# Patient Record
Sex: FEMALE | Race: WHITE | NOT HISPANIC OR LATINO | Employment: STUDENT | URBAN - METROPOLITAN AREA
[De-identification: names, ages, dates, MRNs, and addresses within clinical notes are randomized per-mention and may not be internally consistent; named-entity substitution may affect disease eponyms.]

---
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
## 2021-04-08 ENCOUNTER — OFFICE VISIT - RIVER FALLS (OUTPATIENT)
Dept: FAMILY MEDICINE | Facility: CLINIC | Age: 21
End: 2021-04-08

## 2021-04-08 ASSESSMENT — MIFFLIN-ST. JEOR: SCORE: 1390.49

## 2021-07-14 ENCOUNTER — OFFICE VISIT - RIVER FALLS (OUTPATIENT)
Dept: FAMILY MEDICINE | Facility: CLINIC | Age: 21
End: 2021-07-14

## 2021-09-17 ENCOUNTER — OFFICE VISIT - RIVER FALLS (OUTPATIENT)
Dept: FAMILY MEDICINE | Facility: CLINIC | Age: 21
End: 2021-09-17

## 2022-02-11 VITALS
BODY MASS INDEX: 21.81 KG/M2 | SYSTOLIC BLOOD PRESSURE: 92 MMHG | DIASTOLIC BLOOD PRESSURE: 56 MMHG | OXYGEN SATURATION: 99 % | TEMPERATURE: 98.2 F | HEART RATE: 76 BPM | WEIGHT: 130.9 LBS | HEIGHT: 65 IN

## 2022-02-11 VITALS — SYSTOLIC BLOOD PRESSURE: 100 MMHG | DIASTOLIC BLOOD PRESSURE: 66 MMHG | TEMPERATURE: 98.6 F | WEIGHT: 140 LBS

## 2022-02-11 VITALS
SYSTOLIC BLOOD PRESSURE: 102 MMHG | OXYGEN SATURATION: 100 % | HEIGHT: 65 IN | WEIGHT: 136.6 LBS | HEART RATE: 62 BPM | BODY MASS INDEX: 22.76 KG/M2 | TEMPERATURE: 98.2 F | DIASTOLIC BLOOD PRESSURE: 67 MMHG

## 2022-02-15 NOTE — LETTER
(Inserted Image. Unable to display)   November 27, 2020      TATA ROB      [address], [place] [id number]        Dear TATA,    Thank you for selecting [facility] for your healthcare needs.  Below you will find the results of the recent tests done at our clinic.      These tests are negative, Tata. Thank you.      Result Name Current Result Reference Range   Chlam/N. gonorrhea Comments  See comment 11/24/2020    Chlamydia RNA  NOT DETECTED 11/24/2020 NOT DETECTED -    Neisseria gonorrhoeae RNA  NOT DETECTED 11/24/2020 NOT DETECTED -        Please contact me or my assistant at [phone] if you have any questions or concerns.     Sincerely,        BLAKE Farooq-NP  Family Nurse Practitioner      What do your labs mean?  Below is a glossary of commonly ordered labs:  LDL   Bad Cholesterol   HDL   Good Cholesterol  AST/ALT   Liver Function   Cr/Creatinine   Kidney Function  Microalbumin   Kidney Function  BUN   Kidney Function  PSA   Prostate    TSH   Thyroid Hormone  HgbA1c   Diabetes Test   Hgb (Hemoglobin)   Red Blood Cells  WBC   White Blood Cell Count

## 2022-02-15 NOTE — PROGRESS NOTES
Patient:   REJI ROB            MRN: [id number]            FIN: [id number]               Age:   21 years     Sex:  Female     :  2000   Associated Diagnoses:   Bacterial sinusitis   Author:   Ryan Armstrong MD      Visit Information      Date of Service: 2021 07:47 am  Performing Location: [facility]  Encounter#: [id number]      Primary Care Provider (PCP):  NONE ,       Referring Provider:  Ryan Armstrong MD    NPI# [id number]   Visit type:  Video Visit via [employer] or [employer].    Participants in room during visit:  _pt   Location of patient:  _RF  Location of provider:  _ (Clinic office   Video Start Time:  _856  Video End Time:   _901    Today's visit was conducted via video conference due to the COVID-19 pandemic.  The patient's consent to proceed with a video visit has been obtained and documented.      Chief Complaint   2021 8:54 AM CDT    c/o teeth pain, head pain, nasal congestion x 2 days; verbal consent given for video visit        History of Present Illness   Patient  is being evaluated via a billable video visit.  Patient calls with a possible sinus infection. 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 She is starting on  she had a low-grade fever with congestion. Over the course last 24 hours now she has developed bilateral maxillary pain tooth pain of her upper teeth on either side a lot of nasal congestion with yellow-green bloody nasal drainage.         Review of Systems   Constitutional:  Negative except as documented in history of present illness.    Eye:  Negative.    Ear/Nose/Mouth/Throat:  Negative except as documented in history of present illness.    Respiratory:  Negative.    Cardiovascular:  Negative.    Gastrointestinal:  Negative.    Genitourinary:  Negative.    Immunologic:  Negative.    Musculoskeletal:  Negative.    Integumentary:  Negative.    Neurologic:  Negative.    Psychiatric:  Negative.       Health Status    Allergies:    Allergic Reactions (Selected)  Severity Not Documented  Sulfa drugs (Rash)   Medications:  (Selected)   Prescriptions  Prescribed  Azithromycin 5 Day Dose Pack 250 mg oral tablet: 2 tabs today then 1 a day for 4 days, PO, qAM, x 5 day(s), Instructions: as directed on package labeling, # 6 tab(s), 0 Refill(s), Type: Acute, Pharmacy: [facility] #[id number], 2 tabs today then 1 a day for 4 days Oral qam,x5 day(s),Instr:as dir...  Flonase 50 mcg/inh nasal spray: = 2 spray(s), Nasal, daily, # 1 EA, 6 Refill(s), Type: Maintenance, Pharmacy: [facility] #[id number], 2 spray(s) Nasal daily, 65, in, 04/08/21 11:24:00 CDT, Height Measured, 136.6, lb, 04/08/21 11:24:00 CDT, Weight Measured  Documented Medications  Documented  Juleber 0.15 mg-0.03 mg oral tablet: 0 Refill(s), Type: Soft Stop   Problem list:    No problem items selected or recorded.      Histories   Past Medical History:    No active or resolved past medical history items have been selected or recorded.   Family History:    Cancer  Grandfather (P)  Alzheimer's disease  Grandmother (M)  Sleep apnea  Mother  Father     Procedure history:    No known procedures (SNOMED CT [id number]).   Social History:        Electronic Cigarette/Vaping Assessment            Electronic Cigarette Use: Never.      Alcohol Assessment            Current, Beer (12 oz), Liquor (Hard) (1.5 oz), 1-2 times per week, Ready to change: No.      Tobacco Assessment            Never (less than 100 in lifetime)      Substance Abuse Assessment            Never      Employment and Education Assessment            Student      Home and Environment Assessment            Marital status: Single.  Injuries/Abuse/Neglect in household: No.  Feels unsafe at home: No.  Family/Friends               available for support: Yes.      Nutrition and Health Assessment            Type of diet: Regular.  Wants to lose weight: No.  Sleeping concerns: No.  Feels highly stressed: No.       Exercise and Physical Activity Assessment            Exercise frequency: Daily.  Exercise type: Weight lifting, Cardio.      Sexual Assessment            Sexually active: Yes.  Identifies as female, Sexual orientation: Straight or heterosexual.  History of STD:               No.  Contraceptive Use Details: Birth control shot.  History of sexual abuse: No.        Physical Examination   General:  Alert and oriented, No acute distress.    Eye:  Pupils are equal, round and reactive to light, Normal conjunctiva.    HENT:  Oral mucosa is moist.    Neck:  Supple.    Respiratory:  Respirations are non-labored.    Psychiatric:  Cooperative, Appropriate mood & affect, Normal judgment.       Impression and Plan   Diagnosis     Bacterial sinusitis ([id number] J32.9).     Plan:  Patient with what sounds like a viral URI with secondary sinusitis will treat with azithromycin and Flonase. Follow-up in a few days if not improving sooner if worse in any way. Denies being pregnant.  .

## 2022-02-15 NOTE — NURSING NOTE
CAGE Assessment Entered On:  11/24/2020 12:21 PM CST    Performed On:  11/24/2020 12:20 PM CST by Sharon Anderson LPN               Assessment   Have you ever felt you should cut down on your drinking :   No   Have people annoyed you by criticizing your drinking :   No   Have you ever felt bad or guilty about your drinking :   No   Have you ever taken a drink first thing in the morning to steady your nerves or get rid of a hangover (Eye-opener) :   No   CAGE Score :   0    Sharon Anderson LPN - 11/24/2020 12:20 PM CST

## 2022-02-15 NOTE — NURSING NOTE
Comprehensive Intake Entered On:  9/17/2021 2:22 PM CDT    Performed On:  9/17/2021 2:18 PM CDT by Leslie Walker CMA               Summary   Chief Complaint :   Pain, swelling and drainage on both ears where pierced.    Weight Measured :   140 lb(Converted to: 140 lb 0 oz, 63.503 kg)    Systolic Blood Pressure :   100 mmHg   Diastolic Blood Pressure :   66 mmHg   Mean Arterial Pressure :   77 mmHg   Temperature Tympanic :   98.6 DegF(Converted to: 37.0 DegC)    Leslie Walker CMA - 9/17/2021 2:18 PM CDT   Health Status   Allergies Verified? :   Yes   Medication History Verified? :   Yes   Medical History Verified? :   Yes   Pre-Visit Planning Status :   Completed   Tobacco Use? :   Never smoker   Leslie Walker CMA - 9/17/2021 2:18 PM CDT   Consents   Consent for Immunization Exchange :   Consent Granted   Consent for Immunizations to Providers :   Consent Granted   Leslie Walker CMA - 9/17/2021 2:18 PM CDT   Meds / Allergies   (As Of: 9/17/2021 2:22:32 PM CDT)   Allergies (Active)   sulfa drugs  Estimated Onset Date:   Unspecified ; Reactions:   Rash ; Created By:   Sharon Anderson LPN; Reaction Status:   Active ; Category:   Drug ; Substance:   sulfa drugs ; Type:   Allergy ; Updated By:   Sharon Anderson LPN; Source:   Patient ; Reviewed Date:   9/17/2021 2:20 PM CDT        Medication List   (As Of: 9/17/2021 2:22:32 PM CDT)   Prescription/Discharge Order    fluticasone nasal  :   fluticasone nasal ; Status:   Completed ; Ordered As Mnemonic:   Flonase 50 mcg/inh nasal spray ; Simple Display Line:   2 spray(s), Nasal, daily, 1 EA, 6 Refill(s) ; Ordering Provider:   Ryan Armstrong MD; Catalog Code:   fluticasone nasal ; Order Dt/Tm:   7/14/2021 8:59:54 AM CDT            Home Meds    desogestrel-ethinyl estradiol  :   desogestrel-ethinyl estradiol ; Status:   Documented ; Ordered As Mnemonic:   Juleber 0.15 mg-0.03 mg oral tablet ; Simple Display Line:   0 Refill(s) ; Catalog Code:   desogestrel-ethinyl  estradiol ; Order Dt/Tm:   4/7/2021 3:28:49 PM CDT ; Comment:   Responsible Provider: TAMMIE COREAS

## 2022-02-15 NOTE — NURSING NOTE
Comprehensive Intake Entered On:  11/24/2020 11:41 AM CST    Performed On:  11/24/2020 11:36 AM CST by Sharon Anderson LPN               Summary   Chief Complaint :   vaginal discomfort since Thursday, some burning and itching, no odor, no abnormal discharge   Weight Measured :   130.9 lb(Converted to: 130 lb 14 oz, 59.375 kg)    Height Measured :   65 in(Converted to: 5 ft 5 in, 165.10 cm)    Body Mass Index :   21.78 kg/m2   Body Surface Area :   1.65 m2   Systolic Blood Pressure :   92 mmHg   Diastolic Blood Pressure :   56 mmHg (LOW)    Mean Arterial Pressure :   68 mmHg   Peripheral Pulse Rate :   76 bpm   BP Site :   Right arm   BP Method :   Manual   Temperature Tympanic :   98.2 DegF(Converted to: 36.8 DegC)    Oxygen Saturation :   99 %   Sharon Anderson LPN - 11/24/2020 11:36 AM CST   Health Status   Allergies Verified? :   Yes   Medication History Verified? :   Yes   Medical History Verified? :   No   Pre-Visit Planning Status :   Completed   Tobacco Use? :   Never smoker   Sharon Anderson LPN - 11/24/2020 11:36 AM CST   Meds / Allergies   (As Of: 11/24/2020 11:41:34 AM CST)   Allergies (Active)   sulfa drugs  Estimated Onset Date:   Unspecified ; Reactions:   Rash ; Created By:   Sharon Anderson LPN; Reaction Status:   Active ; Category:   Drug ; Substance:   sulfa drugs ; Type:   Allergy ; Updated By:   Sharon Anderson LPN; Source:   Patient ; Reviewed Date:   11/24/2020 11:37 AM CST        Medication List   (As Of: 11/24/2020 11:41:34 AM CST)   Home Meds    Miscellaneous Prescription  :   Miscellaneous Prescription ; Status:   Documented ; Ordered As Mnemonic:   Oral Birth Control Pill ; Simple Display Line:   Oral, daily, 0 Refill(s) ; Catalog Code:   Miscellaneous Prescription ; Order Dt/Tm:   11/24/2020 11:38:00 AM CST            ID Risk Screen   Recent Travel History :   No recent travel   Family Member Travel History :   No recent travel   Other Exposure to Infectious Disease :    Unknown   Sharon Anderson LPN - 11/24/2020 11:36 AM CST   Social History   Social History   (As Of: 11/24/2020 11:41:34 AM CST)   Tobacco:  Denies Tobacco Use      Never (less than 100 in lifetime)   (Last Updated: 11/24/2020 11:39:04 AM CST by Sharon Anderson LPN)          Electronic Cigarette/Vaping:  Denies Electronic Cigarette Use      Electronic Cigarette Use: Never.   (Last Updated: 11/24/2020 11:39:08 AM CST by Sharon Anderson LPN)

## 2022-02-15 NOTE — NURSING NOTE
Comprehensive Intake Entered On:  4/8/2021 11:27 AM CDT    Performed On:  4/8/2021 11:24 AM CDT by Dee Saba MA               Summary   Chief Complaint :   preemployment px--[employer]   Weight Measured :   136.6 lb(Converted to: 136 lb 10 oz, 61.961 kg)    Height Measured :   65 in(Converted to: 5 ft 5 in, 165.10 cm)    Body Mass Index :   22.73 kg/m2   Body Surface Area :   1.68 m2   Systolic Blood Pressure :   102 mmHg   Diastolic Blood Pressure :   67 mmHg   Mean Arterial Pressure :   79 mmHg   Peripheral Pulse Rate :   62 bpm   BP Site :   Right arm   Pulse Site :   Radial artery   BP Method :   Electronic   HR Method :   Electronic   Temperature Tympanic :   98.2 DegF(Converted to: 36.8 DegC)    Oxygen Saturation :   100 %   Dee Saba MA - 4/8/2021 11:24 AM CDT   Health Status   Allergies Verified? :   Yes   Medication History Verified? :   Yes   Medical History Verified? :   Yes   Pre-Visit Planning Status :   Completed   Tobacco Use? :   Never smoker   Dee Saba MA - 4/8/2021 11:24 AM CDT   Consents   Consent for Immunization Exchange :   Consent Granted   Consent for Immunizations to Providers :   Consent Granted   Dee Saba MA - 4/8/2021 11:24 AM CDT   Meds / Allergies   (As Of: 4/8/2021 11:27:44 AM CDT)   Allergies (Active)   sulfa drugs  Estimated Onset Date:   Unspecified ; Reactions:   Rash ; Created By:   Sharon Anderson LPN; Reaction Status:   Active ; Category:   Drug ; Substance:   sulfa drugs ; Type:   Allergy ; Updated By:   Sharon Anderson LPN; Source:   Patient ; Reviewed Date:   11/24/2020 11:37 AM CST        Medication List   (As Of: 4/8/2021 11:27:44 AM CDT)   Home Meds    desogestrel-ethinyl estradiol  :   desogestrel-ethinyl estradiol ; Status:   Documented ; Ordered As Mnemonic:   Juleber 0.15 mg-0.03 mg oral tablet ; Simple Display Line:   0 Refill(s) ; Catalog Code:   desogestrel-ethinyl estradiol ; Order Dt/Tm:   4/7/2021 3:28:49 PM CDT ; Comment:    Responsible Provider: TAMMIE COREAS          Miscelllissa Prescription  :   Miscellaneous Prescription ; Status:   Deleted ; Ordered As Mnemonic:   Oral Birth Control Pill ; Simple Display Line:   Oral, daily, 0 Refill(s) ; Catalog Code:   Miscellaneous Prescription ; Order Dt/Tm:   11/24/2020 11:38:00 AM CST            ID Risk Screen   Recent Travel History :   No recent travel   Family Member Travel History :   No recent travel   Other Exposure to Infectious Disease :   Unknown   COVID-19 Testing Status :   No positive COVID-19 test   Wandy ROY, Dee - 4/8/2021 11:24 AM CDT   Social History   Social History   (As Of: 4/8/2021 11:27:44 AM CDT)   Alcohol:        Current, Beer (12 oz), Liquor (Hard) (1.5 oz), 1-2 times per week, Ready to change: No.   (Last Updated: 12/1/2020 12:30:13 PM CST by Judy Lemus)          Tobacco:        Never (less than 100 in lifetime)   (Last Updated: 11/24/2020 11:39:04 AM CST by Sharon Anderson LPN)          Electronic Cigarette/Vaping:        Electronic Cigarette Use: Never.   (Last Updated: 11/24/2020 11:39:08 AM CST by Sharon Anderson LPN)          Substance Abuse:        Never   (Last Updated: 12/1/2020 12:30:18 PM CST by Judy Lemus)          Employment/School:        Student   (Last Updated: 12/1/2020 12:30:29 PM CST by Judy Lemus)          Home/Environment:        Marital status: Single.  Injuries/Abuse/Neglect in household: No.  Feels unsafe at home: No.  Family/Friends available for support: Yes.   (Last Updated: 12/1/2020 12:30:46 PM CST by Judy Lemus)          Nutrition/Health:        Type of diet: Regular.  Wants to lose weight: No.  Sleeping concerns: No.  Feels highly stressed: No.   (Last Updated: 12/1/2020 12:30:57 PM CST by Judy Lemus)          Exercise:        Exercise frequency: Daily.  Exercise type: Weight lifting, Cardio.   (Last Updated: 12/1/2020 12:31:14 PM CST by Judy Lemus)          Sexual:        Sexually active: Yes.  Identifies as  female, Sexual orientation: Straight or heterosexual.  History of STD: No.  Contraceptive Use Details: Birth control shot.  History of sexual abuse: No.   (Last Updated: 12/1/2020 12:31:30 PM CST by Judy Lemus)

## 2022-02-15 NOTE — PROGRESS NOTES
Chief Complaint    Pain, swelling and drainage on both ears where pierced.  History of Present Illness       Patient is a 30-year-old female comes in with concerns about infection in her ear piercing.  She said her ears pierced for about 2 years.  She has very sensitive skin.  She has a history of infection of the piercings.  Her left ear has been bothering her for about 2 weeks and her right ear for about a week.  She is tried salt water rinses and rubbing alcohol to clean the areas.       She otherwise does not feel ill.  No fevers, chills, inner ear pain, sore throat, congestion.       She is allergic to sulfa  Review of Systems       Negative except as listed in HPI  Physical Exam   Vitals & Measurements    T: 98.6  F (Tympanic)  BP: 100/66     WT: 140 lb        Vitals noted and within normal limits.       In general she is alert, oriented, and in no acute distress       Ears canals patent, TMs intact, no erythema no bulging       Bilateral ear piercings with earrings still in place.  There is mild swelling with erythema left greater than right.  Backs of the earrings fully visualized.       Neck is supple with positive left-sided superior anterior cervical lymphadenopathy       Breathing not labored  Assessment/Plan       Pierced ear infection (S01.339A)          Discussed infection versus contact dermatitis         We will treat with cephalexin 500 mg 3 times a day for 7 days         Recommended removal of these earrings and acknowledges risk of losing the piercings.  Recommend not wearing these earrings again for a few months and then trying them again to see if she in fact is allergic to a component of the earring.  Patient verbalized understanding.         Ordered:          cephalexin, = 1 cap(s) ( 500 mg ), Oral, tid, x 7 day(s), # 21 cap(s), 0 Refill(s), Type: Acute, Pharmacy: [facility] #[id number], 1 cap(s) Oral tid,x7 day(s), 65, in, 04/08/21 11:24:00 CDT, Height Measured, 140, lb, 09/17/21  14:18:00 CDT, Weight Measured, (Ordered)           Patient Information     Name:REJI ROB      Address:      [address] [place] [id number]     Sex:Female     YOB: 2000     [phone]     Emergency Contact:GINO ROB     MRN:[id number]     FIN:[id number]     Location:[facility]     Date of Service:09/17/2021      Primary Care Physician:       NONE ,       Attending Physician:       Yudi Ramirez MD, [phone]  Problem List/Past Medical History    Ongoing     No qualifying data    Historical     No qualifying data  Procedure/Surgical History     No known procedures  Medications    cephalexin 500 mg oral capsule, 500 mg= 1 cap(s), Oral, tid    Juleber 0.15 mg-0.03 mg oral tablet  Allergies    sulfa drugs (Rash)  Social History    Smoking Status     Never smoker     Alcohol      Current, Beer (12 oz), Liquor (Hard) (1.5 oz), 1-2 times per week, Ready to change: No.     Electronic Cigarette/Vaping      Electronic Cigarette Use: Never.     Employment/School      Student     Exercise      Exercise frequency: Daily. Exercise type: Weight lifting, Cardio.     Home/Environment      Marital status: Single. Injuries/Abuse/Neglect in household: No. Feels unsafe at home: No. Family/Friends available for support: Yes.     Nutrition/Health      Type of diet: Regular. Wants to lose weight: No. Sleeping concerns: No. Feels highly stressed: No.     Sexual      Sexually active: Yes. Identifies as female, Sexual orientation: Straight or heterosexual. History of STD: No. Contraceptive Use Details: Birth control shot. History of sexual abuse: No.     Substance Abuse      Never     Tobacco      Never (less than 100 in lifetime)  Family History    Alzheimer's disease: Grandmother (M).    Cancer: Grandfather (P).    Sleep apnea: Mother and Father.  Immunizations       Scheduled Immunizations       Dose Date(s)       DTaP       2000, 2001, 2001, 08/05/2002,  05/04/2006       Hep B-Hib       2001, 2001, 05/10/2002       human papillomavirus vaccine       12/27/2018, 06/07/2019, 08/12/2019       influenza virus vaccine, inactivated       11/03/2019, 11/05/2009, 10/10/2013, 10/16/2014, 10/15/2015, 10/20/2016, 10/26/2017, 12/27/2018       IPV       2001, 2001, 05/10/2002, 05/04/2006       meningococcal conjugate vaccine       12/27/2018       MMR (measles/mumps/rubella)       08/05/2002, 05/04/2006       pneumococcal (PCV7)       2001, 2001, 01/11/2002, 08/05/2002       tetanus/diphth/pertuss (Tdap) adult/adol       05/08/2012       varicella       05/09/2003, 06/16/2003, 05/08/2012       Other Immunizations               influenza virus vaccine, inactivated       11/24/2020

## 2022-02-15 NOTE — PROGRESS NOTES
Patient:   REJI ROB            MRN: [id number]            FIN: [id number]               Age:   20 years     Sex:  Female     :  2000   Associated Diagnoses:   Pre-employment examination   Author:   Akil Blanco MD      Visit Information      Date of Service: 2021 11:20 am  Performing Location: [facility]  Encounter#: [id number]      Primary Care Provider (PCP):  NONE ,       Chief Complaint   2021 11:24 AM CDT    preemployment px--[employer]        History of Present Illness             The patient presents for pre-employment exam.  The patient's general health status is described as good.        Review of Systems   Constitutional:  No fever, No chills, No sweats, No weakness, No fatigue.    Eye:  No recent visual problem.    Ear/Nose/Mouth/Throat:  No decreased hearing, No nasal congestion, No sore throat.    Respiratory:  No shortness of breath, No cough.    Cardiovascular:  Negative, No chest pain, No palpitations, No peripheral edema.    Gastrointestinal:  No nausea, No vomiting, No diarrhea, No constipation, No heartburn.    Genitourinary:  No dysuria, No change in urine stream.    Hematology/Lymphatics:  No bruising tendency, No bleeding tendency.    Endocrine:  No cold intolerance, No heat intolerance.    Immunologic:  Negative.    Musculoskeletal:  No back pain, No neck pain, No joint pain, No muscle pain.    Integumentary:  No rash, No dryness, No skin lesion.    Neurologic:  Alert and oriented X4, No headache.    Psychiatric:  No anxiety, No depression.       Health Status   Allergies:    Allergic Reactions (Selected)  Severity Not Documented  Sulfa drugs (Rash)   Medications:  (Selected)   Documented Medications  Documented  Juleber 0.15 mg-0.03 mg oral tablet: 0 Refill(s), Type: Soft Stop      Histories   Past Medical History:    No active or resolved past medical history items have been selected or recorded.   Family History:    Cancer  Grandfather  (P)  Alzheimer's disease  Grandmother (M)  Sleep apnea  Mother  Father     [facility]:    [id number]   Social History:        Electronic Cigarette/Vaping Assessment            Electronic Cigarette Use: Never.      Alcohol Assessment            Current, Beer (12 oz), Liquor (Hard) (1.5 oz), 1-2 times per week, Ready to change: No.      Tobacco Assessment            Never (less than 100 in lifetime)      Substance Abuse Assessment            Never      Employment and Education Assessment            Student      Home and Environment Assessment            Marital status: Single.  Injuries/Abuse/Neglect in household: No.  Feels unsafe at home: No.  Family/Friends               available for support: Yes.      Nutrition and Health Assessment            Type of diet: Regular.  Wants to lose weight: No.  Sleeping concerns: No.  Feels highly stressed: No.      Exercise and Physical Activity Assessment            Exercise frequency: Daily.  Exercise type: Weight lifting, Cardio.      Sexual Assessment            Sexually active: Yes.  Identifies as female, Sexual orientation: Straight or heterosexual.  History of STD:               No.  Contraceptive Use Details: Birth control shot.  History of sexual abuse: No.        Physical Examination   Vital Signs   4/8/2021 11:24 AM CDT Temperature Tympanic 98.2 DegF    Peripheral Pulse Rate 62 bpm    Pulse Site Radial artery    HR Method Electronic    Systolic Blood Pressure 102 mmHg    Diastolic Blood Pressure 67 mmHg    Mean Arterial Pressure 79 mmHg    BP Site Right arm    BP Method Electronic    Oxygen Saturation 100 %      Measurements from flowsheet : Measurements   4/8/2021 11:24 AM CDT Height Measured - Standard 65 in    Height/Length Percentile 0.00    Height/Length Z-score -14.57    Weight Measured - Standard 136.6 lb    Weight Percentile 99.80    Weight Z-score 2.87    BSA 1.68 m2    Body Mass Index 22.73 kg/m2    Body Mass Index  Percentile 61.20    BMI Z-score 0.28      General:  Alert and oriented, No acute distress.    Eye:  Pupils are equal, round and reactive to light, Extraocular movements are intact, Normal conjunctiva.    HENT:  Normocephalic, Tympanic membranes are clear, Oral mucosa is moist, No pharyngeal erythema, No sinus tenderness.    Neck:  Supple, Non-tender, No carotid bruit, No lymphadenopathy, No thyromegaly.    Respiratory:  Lungs are clear to auscultation, Respirations are non-labored, Breath sounds are equal, No chest wall tenderness.    Cardiovascular:  Normal rate, Regular rhythm, No murmur, No gallop, Normal peripheral perfusion, No edema.    Gastrointestinal:  Soft, Non-tender, No organomegaly.    Musculoskeletal:  Normal range of motion, Normal strength, No swelling, No deformity.    Integumentary:  Warm, Dry, No rash.    Neurologic:  Alert, Oriented, No focal deficits.    Psychiatric:  Cooperative, Appropriate mood & affect.       Impression and Plan   Diagnosis     Pre-employment examination ([id number] Z02.1).     Plan:  forms filled out and signed.

## 2022-02-15 NOTE — PROGRESS NOTES
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 They don't see each other often so when they do they will have multiple episodes of IC. she became sore after the first episode and that is unusual. No condoms used. They did use some new/different sex toys and gel/lubricant  no sores or discharge  has not had a vaginal yeast infection in the past    no fever or abdominal pain    uses oc's for contraception, takes daily,  lmp 3 weeks ago with placebo pills    has had Gardisil vaccine but one vaccine given too early, she will f/u with this at home clinic.  desires flu shot today      Health Status   Allergies:    Allergic Reactions (Selected)  Severity Not Documented  Sulfa drugs (Rash)   Medications:  (Selected)   Documented Medications  Documented  Oral Birth Control Pill: Oral Birth Control Pill, Oral, daily, 0 Refill(s), Type: Maintenance   Problem list:    No problem items selected or recorded.      Histories   Past Medical History:    No active or resolved past medical history items have been selected or recorded.   Family History:    No family history items have been selected or recorded.   Procedure history:    No active procedure history items have been selected or recorded.   Social History:        Electronic Cigarette/Vaping Assessment: Denies Electronic Cigarette Use            Electronic Cigarette Use: Never.      Tobacco Assessment: Denies Tobacco Use            Never (less than 100 in lifetime)        Physical Examination   Vital Signs    11/24/2020 11:36 AM CST Temperature Tympanic 98.2 DegF    Peripheral Pulse Rate 76 bpm    Systolic Blood Pressure 92 mmHg    Diastolic Blood Pressure 56 mmHg  LOW    Mean Arterial Pressure 68 mmHg    BP Site Right arm    BP Method Manual    Oxygen Saturation 99 %      Measurements from flowsheet : Measurements   11/24/2020 11:36 AM CST Height Measured - Standard 65 in    Height/Length Z-score -14.62    Weight Measured - Standard 130.9 lb    Weight Percentile 99.72    Weight Z-score 2.77    BSA 1.65 m2    Body Mass Index 21.78 kg/m2    Body Mass Index Percentile 51.58    BMI Z-score 0.04      General:  Alert and oriented.    HENT:  Normocephalic.    Genitourinary:  No inguinal tenderness, No urethral discharge, No lesions, pelvic exam reveals no inguinal lymph nodes palpable, no external lesions, no odor no discharge. Introitus normal with vault with normal rugae, cervix visualized and clear. No cervical motion tenderness, no adnexal tenderness or masses  .       Review / Management   Results review:  wet prep negative.       Impression and Plan   Diagnosis     Encounter for immunization ([id number] Z23).     Vaginal discomfort (UBO27-HJ N94.9).     Plan:  will await CT/GC test results. If negative, it is likely a sensitivity/allergy to latex (on the sex toy) or a lubricant/gel and she will monitor for  future reaction.    Patient Instructions:       Counseled: Patient, Regarding diagnosis, Regarding treatment, Regarding medications, Verbalized understanding.    Orders     Orders (Selected)   Outpatient Orders  Ordered (In Transit)  Chlamydia/Neisseria gonorrhoeae RNA, TMA* ([facility]): Specimen Type: Swab, Collection Date: 11/24/20 12:09:00 CST.

## 2022-02-15 NOTE — NURSING NOTE
Depression Screening Entered On:  11/24/2020 12:21 PM CST    Performed On:  11/24/2020 12:20 PM CST by Sharon Anderson LPN               Depression Screening   Little Interest - Pleasure in Activities :   Not at all   Feeling Down, Depressed, Hopeless :   Not at all   Initial Depression Screen Score :   0 Score   Poor Appetite or Overeating :   Not at all   Trouble Falling or Staying Asleep :   Not at all   Feeling Tired or Little Energy :   Not at all   Feeling Bad About Yourself :   Not at all   Trouble Concentrating :   Not at all   Moving or Speaking Slowly :   Not at all   Thoughts Better Off Dead or Hurting Self :   Not at all   Detailed Depression Screen Score :   0    Total Depression Screen Score :   0    Sharon Anderson LPN - 11/24/2020 12:20 PM CST

## 2022-02-15 NOTE — NURSING NOTE
Comprehensive Intake Entered On:  7/14/2021 8:56 AM CDT    Performed On:  7/14/2021 8:54 AM CDT by Anabel Diaz CMA               Summary   Chief Complaint :   c/o teeth pain, head pain, nasal congestion x 2 days; verbal consent given for video visit   Anabel Diaz CMA - 7/14/2021 8:54 AM CDT   Health Status   Allergies Verified? :   Yes   Medication History Verified? :   Yes   Medical History Verified? :   Yes   Tobacco Use? :   Never smoker   Anabel Diaz CMA - 7/14/2021 8:54 AM CDT   Consents   Consent for Immunization Exchange :   Consent Granted   Consent for Immunizations to Providers :   Consent Granted   Anabel Diaz CMA - 7/14/2021 8:54 AM CDT   Meds / Allergies   (As Of: 7/14/2021 8:56:11 AM CDT)   Allergies (Active)   sulfa drugs  Estimated Onset Date:   Unspecified ; Reactions:   Rash ; Created By:   Shraon Anderson LPN; Reaction Status:   Active ; Category:   Drug ; Substance:   sulfa drugs ; Type:   Allergy ; Updated By:   Sharon Anderson LPN; Source:   Patient ; Reviewed Date:   7/14/2021 8:55 AM CDT        Medication List   (As Of: 7/14/2021 8:56:11 AM CDT)   Home Meds    desogestrel-ethinyl estradiol  :   desogestrel-ethinyl estradiol ; Status:   Documented ; Ordered As Mnemonic:   Juleber 0.15 mg-0.03 mg oral tablet ; Simple Display Line:   0 Refill(s) ; Catalog Code:   desogestrel-ethinyl estradiol ; Order Dt/Tm:   4/7/2021 3:28:49 PM CDT ; Comment:   Responsible Provider: TAMMIE COREAS